# Patient Record
Sex: FEMALE | Race: WHITE | Employment: FULL TIME | URBAN - METROPOLITAN AREA
[De-identification: names, ages, dates, MRNs, and addresses within clinical notes are randomized per-mention and may not be internally consistent; named-entity substitution may affect disease eponyms.]

---

## 2017-03-09 ENCOUNTER — OFFICE VISIT (OUTPATIENT)
Dept: FAMILY MEDICINE CLINIC | Age: 27
End: 2017-03-09

## 2017-03-09 VITALS
DIASTOLIC BLOOD PRESSURE: 76 MMHG | WEIGHT: 138.4 LBS | TEMPERATURE: 97.8 F | OXYGEN SATURATION: 98 % | HEART RATE: 81 BPM | SYSTOLIC BLOOD PRESSURE: 126 MMHG | BODY MASS INDEX: 23.21 KG/M2

## 2017-03-09 DIAGNOSIS — Z23 NEED FOR IMMUNIZATION AGAINST TYPHOID: Primary | ICD-10-CM

## 2017-03-09 DIAGNOSIS — B07.0 PLANTAR WART, LEFT FOOT: ICD-10-CM

## 2017-03-09 PROCEDURE — 17110 DESTRUCTION B9 LES UP TO 14: CPT | Performed by: FAMILY MEDICINE

## 2017-03-09 PROCEDURE — 99213 OFFICE O/P EST LOW 20 MIN: CPT | Performed by: FAMILY MEDICINE

## 2018-01-29 ENCOUNTER — PATIENT MESSAGE (OUTPATIENT)
Dept: FAMILY MEDICINE CLINIC | Age: 28
End: 2018-01-29

## 2018-01-29 NOTE — TELEPHONE ENCOUNTER
From: [address]  To: Lay Adrian MD  Sent: 1/29/2018 2:55 PM EST  Subject: Non-Urgent Medical Question    Hi Rosa,     I am not looking to remove it this week (I have an appointment next week with Dr. Clover Ponce to remove). I am simply looking to help the pain (numb it) while I ski for the next 5 days. Is there something you recommend for that?  ----- Message -----  From: Abel Monteiro  Sent: 1/29/2018 2:20 PM EST  To: Shruthi [address]  Subject: RE: Non-Urgent Medical Question  You can try over the counter:   Compound wart freeze off or fast acting liquid remover  Dr Kiera Ayala.      ----- Message -----   From: Shruthi St   Sent: 1/29/2018 9:31 AM EST   To: Lay Adrian MD  Subject: Non-Urgent Medical Question    Hel,     Last time I was in, I had Dr. Clover Ponce freeze a plantars wart on the bottom of my left foot. I need to schedule an appointment to come in again to freeze, however I am currently on a ski vacation and my foot is really bothering me. I really want to finish out my week skiing and am wondering if there is any sort of temporary topical numbing cream I can use or get a prescription for (like a topical lidocaine)?      Thanks,  Evens Calderon

## 2018-01-29 NOTE — TELEPHONE ENCOUNTER
From: [address]  To: Castillo Sweeney MD  Sent: 1/29/2018 9:31 AM EST  Subject: Non-Urgent Medical Question    Hello,     Last time I was in, I had Dr. Janessa Rosales freeze a plantars wart on the bottom of my left foot. I need to schedule an appointment to come in again to freeze, however I am currently on a ski vacation and my foot is really bothering me. I really want to finish out my week skiing and am wondering if there is any sort of temporary topical numbing cream I can use or get a prescription for (like a topical lidocaine)?      Thanks,  Hoang

## 2018-02-19 ENCOUNTER — OFFICE VISIT (OUTPATIENT)
Dept: FAMILY MEDICINE CLINIC | Age: 28
End: 2018-02-19

## 2018-02-19 VITALS
SYSTOLIC BLOOD PRESSURE: 117 MMHG | WEIGHT: 145 LBS | RESPIRATION RATE: 18 BRPM | OXYGEN SATURATION: 98 % | HEART RATE: 99 BPM | DIASTOLIC BLOOD PRESSURE: 82 MMHG | HEIGHT: 66 IN | BODY MASS INDEX: 23.3 KG/M2 | TEMPERATURE: 98.1 F

## 2018-02-19 DIAGNOSIS — B07.0 PLANTAR WART, LEFT FOOT: Primary | ICD-10-CM

## 2018-02-19 PROCEDURE — 17110 DESTRUCTION B9 LES UP TO 14: CPT | Performed by: FAMILY MEDICINE

## 2018-05-15 PROBLEM — O00.90 ECTOPIC PREGNANCY: Status: ACTIVE | Noted: 2018-05-01

## 2019-09-27 ENCOUNTER — OFFICE VISIT (OUTPATIENT)
Dept: FAMILY MEDICINE CLINIC | Age: 29
End: 2019-09-27
Payer: COMMERCIAL

## 2019-09-27 VITALS
OXYGEN SATURATION: 97 % | DIASTOLIC BLOOD PRESSURE: 86 MMHG | BODY MASS INDEX: 25.56 KG/M2 | TEMPERATURE: 98.2 F | SYSTOLIC BLOOD PRESSURE: 131 MMHG | HEART RATE: 77 BPM | WEIGHT: 156 LBS | RESPIRATION RATE: 16 BRPM

## 2019-09-27 DIAGNOSIS — Z23 NEED FOR CHICKENPOX VACCINATION: ICD-10-CM

## 2019-09-27 DIAGNOSIS — O00.90 ECTOPIC PREGNANCY, UNSPECIFIED LOCATION, UNSPECIFIED WHETHER INTRAUTERINE PREGNANCY PRESENT: ICD-10-CM

## 2019-09-27 DIAGNOSIS — Z23 FLU VACCINE NEED: ICD-10-CM

## 2019-09-27 DIAGNOSIS — E28.2 PCOS (POLYCYSTIC OVARIAN SYNDROME): Primary | ICD-10-CM

## 2019-09-27 PROCEDURE — 90686 IIV4 VACC NO PRSV 0.5 ML IM: CPT | Performed by: FAMILY MEDICINE

## 2019-09-27 PROCEDURE — 90471 IMMUNIZATION ADMIN: CPT | Performed by: FAMILY MEDICINE

## 2019-09-27 PROCEDURE — 90716 VAR VACCINE LIVE SUBQ: CPT | Performed by: FAMILY MEDICINE

## 2019-09-27 PROCEDURE — 99213 OFFICE O/P EST LOW 20 MIN: CPT | Performed by: FAMILY MEDICINE

## 2019-09-27 PROCEDURE — 90472 IMMUNIZATION ADMIN EACH ADD: CPT | Performed by: FAMILY MEDICINE

## 2019-09-27 NOTE — PROGRESS NOTES
Patient is here for follow up . She was seeing fertility specialist , Dr. Jose Glasgow. She has had 2 ectopic pregnancies - 5/18 - right;  8/19 - ? Side. She took MTX X 1 shot. No prior STDs. She was diagnosed with PCOS. She was thinking about Clomid, IVF. She goes to [facility].  Gynecologist = Dr. Tana Aguilar. Dr. Mala Livingston did first ectopic. Review of Systems    ROS: All other systems were reviewed and are negative . Patient's allergies and medications were reviewed. Patient's past medical, surgical, social , and family history were reviewed. OBJECTIVE:  /86   Pulse 77   Temp 98.2 °F (36.8 °C) (Oral)   Resp 16   Wt 156 lb (70.8 kg)   LMP 09/23/2019 (Approximate)   SpO2 97%   Breastfeeding? No   BMI 25.56 kg/m²     Physical Exam    General: NAD, cooperative, alert and oriented X 3. Mood / affect is good. good insight. well hydrated. Neck : no lymphadenopathy, supple, FROM  CV: Regular rate and rhythm , no murmurs/ rub/ gallop. No edema. Lungs : CTA bilaterally, breathing comfortably  Abdomen: positive bowel sounds, soft , non tender, non distended. No hepatosplenomegaly. No CVA tenderness. Skin: no rashes. Non tender. ASSESSMENT/  PLAN:  1. PCOS (polycystic ovarian syndrome)  - stable. 2. Ectopic pregnancy, unspecified location, unspecified whether intrauterine pregnancy present  - followed by Fertility specialist.      3. Need for chickenpox vaccination  - Varicella vaccine subcutaneous (VARIVAX)    4. Flu vaccine need  - INFLUENZA, QUADV, 0.5ML, 6 MO AND OLDER, IM, PF, PREFILL SYR OR SDV (FLUZONE QUADV, PF)    Follow up in 6 months/ prn.

## 2019-09-27 NOTE — PROGRESS NOTES
Vaccine Information Sheet, \"Influenza - Inactivated\"  given to Zahraa Schuler, or parent/legal guardian of  Zahraa Schuler and verbalized understanding. Patient responses:    Have you ever had a reaction to a flu vaccine? No  Do you have any current illness? No  Have you ever had Guillian [place] Syndrome? No  Do you have a serious allergy to any of the follow: Neomycin, Polymyxin, Thimerosal, eggs or egg products? No    Flu vaccine given per order. Please see immunization tab. Risks and benefits explained. Current VIS given.       Immunizations Administered     Name Date Dose Route    Influenza, Quadv, IM, PF (6 mo and older Fluzone, Flulaval, Fluarix, and 3 yrs and older Afluria) 9/27/2019 0.5 mL Intramuscular    Site: Deltoid- Left    Lot: [id number]    ND: 75567-600-65    Varicella (Varivax) 9/27/2019 0.5 mL Subcutaneous    Site: Deltoid- Left    Lot: [id number]    ND: 5687-2173-07

## 2019-09-29 PROBLEM — E28.2 PCOS (POLYCYSTIC OVARIAN SYNDROME): Status: ACTIVE | Noted: 2019-09-29

## 2019-10-21 ENCOUNTER — TELEPHONE (OUTPATIENT)
Dept: FAMILY MEDICINE CLINIC | Age: 29
End: 2019-10-21

## 2019-10-28 ENCOUNTER — NURSE ONLY (OUTPATIENT)
Dept: FAMILY MEDICINE CLINIC | Age: 29
End: 2019-10-28
Payer: COMMERCIAL

## 2019-10-28 VITALS — TEMPERATURE: 98.2 F

## 2019-10-28 DIAGNOSIS — Z23 NEED FOR VARICELLA VACCINE: Primary | ICD-10-CM

## 2019-10-28 PROCEDURE — 90471 IMMUNIZATION ADMIN: CPT | Performed by: FAMILY MEDICINE

## 2019-10-28 PROCEDURE — 90716 VAR VACCINE LIVE SUBQ: CPT | Performed by: FAMILY MEDICINE

## 2020-11-10 ENCOUNTER — VIRTUAL VISIT (OUTPATIENT)
Dept: FAMILY MEDICINE CLINIC | Age: 30
End: 2020-11-10
Payer: COMMERCIAL

## 2020-11-10 PROCEDURE — 99213 OFFICE O/P EST LOW 20 MIN: CPT | Performed by: FAMILY MEDICINE

## 2020-11-10 RX ORDER — SULFAMETHOXAZOLE AND TRIMETHOPRIM 800; 160 MG/1; MG/1
1 TABLET ORAL 2 TIMES DAILY
Qty: 20 TABLET | Refills: 0 | Status: SHIPPED | OUTPATIENT
Start: 2020-11-10 | End: 2020-11-20

## 2020-11-10 RX ORDER — NORETHINDRONE AND ETHINYL ESTRADIOL 1 MG-35MCG
1 KIT ORAL DAILY
COMMUNITY
End: 2022-02-24

## 2020-11-10 NOTE — PROGRESS NOTES
11/10/2020    TELEHEALTH EVALUATION -- Audio/Visual (During - public health emergency)    Due to COVID 19 outbreak, patient's office visit was converted to a virtual visit. Patient was contacted and agreed to proceed with a virtual visit via [employer]. me  The risks and benefits of converting to a virtual visit were discussed in light of the current infectious disease epidemic. Patient also understood that insurance coverage and co-pays are up to their individual insurance plans. HPI:    Clarence Dominguez (:  1990) has requested an audio/video evaluation for the following concern(s):    Right great toenail problem. Patient is in [place] now - got there on Friday and leaving Saturday. Right great toenail issue started 3 weeks ago. She has had problems in the past, but usually can usually work it out , but flaring the past 3 days. She started soaks 2 days ago. Sand likely aggravated it. No drainage now or prior. Has been running. She is on birth control pills to regulate cycle. She will likely retry IVF in a few months. Review of Systems    Prior to Visit Medications    Medication Sig Taking?  Authorizing Provider   metFORMIN (GLUCOPHAGE) 850 MG tablet Take 850 mg by mouth daily Yes Historical Provider, MD   norethindrone-ethinyl estradiol (DASETTA ) 1-35 MG-MCG per tablet Take 1 tablet by mouth daily Yes Historical Provider, MD   typhoid vaccine (VIVOTIF) delayed release capsule Take 1 capsule by mouth every other day  Patient not taking: Reported on 2019  Claudell Maid, MD   PROVENTIL  (90 BASE) MCG/ACT inhaler INHALE 2 PUFFS INTO THE LUNGS EVERY 6 HOURS AS NEEDED FOR WHEEZING  Patient not taking: Reported on 2019  Claudell Maid, MD   omeprazole (PRILOSEC) 40 MG capsule Take 1 capsule by mouth daily  Patient not taking: Reported on 2019  Claudell Maid, MD   spironolactone (ALDACTONE) 100 MG tablet Take 1 tablet by mouth daily  Patient not taking: Reported on 9/27/2019  Thais Nicolas MD   minocycline (MINOCIN;DYNACIN) 100 MG capsule Take 1 po qd. Patient not taking: Reported on 9/27/2019  Thais Nicolas MD   norgestimate-ethinyl estradiol (MONONESSA) 0.25-35 MG-MCG per tablet Take 1 tablet by mouth daily. Historical Provider, MD       No Known Allergies    Social History     Tobacco Use    Smoking status: Never Smoker    Smokeless tobacco: Never Used   Substance Use Topics    Alcohol use: Yes     Alcohol/week: 4.0 standard drinks     Types: 4 Standard drinks or equivalent per week     Comment: occasionally    Drug use: Not on file        Past Medical History:   Diagnosis Date    Acne     Dysmenorrhea     Ectopic pregnancy 05/2018, 8/19    right    Female infertility due to occlusion of fallopian tube 09/2018    right.  History of chickenpox 1993       Past Surgical History:   Procedure Laterality Date    WISDOM TOOTH EXTRACTION         Health Maintenance   Topic Date Due    HIV screen  04/10/2005    Cervical cancer screen  04/10/2011    Potassium monitoring  04/25/2017    Creatinine monitoring  04/25/2017    Flu vaccine (1) 09/01/2020    DTaP/Tdap/Td vaccine (8 - Td) 02/01/2022    Hepatitis A vaccine  Completed    Hepatitis B vaccine  Completed    Varicella vaccine  Completed    Meningococcal (ACWY) vaccine  Completed    Hib vaccine  Aged Out    Pneumococcal 0-64 years Vaccine  Aged Out       Family History   Problem Relation Age of Onset    Breast Cancer Maternal Grandmother        PHYSICAL EXAMINATION:    Vital Signs: (As obtained by patient/caregiver or practitioner observation)     Patient-Reported Vitals 11/10/2020   Patient-Reported Weight 148 lb   Patient-Reported Height 5 ft 6 in      Heart rate= 80  Respiratory rate= 14 Temperature= 98      Constitutional:  Appears well-developed and well-nourished. No apparent distress                              Mental status:  Alert and awake. Oriented to person/place/time.  Able to follow commands Eyes: EOM intact. Sclera-normal. No erythema of conjunctiva. No eye discharge. HENT: Normocephalic, atraumatic. Mouth/Throat: normal. Mucous membranes are moist.      External Ears: Normal       Neck: No visualized mass      Pulmonary/Chest:  Respiratory effort normal.  No visualized signs of difficulty breathing or respiratory distress         Musculoskeletal:   Normal gait with no signs of ataxia. Normal range of motion of neck. Neurological:         No Facial Asymmetry (Cranial nerve 7 motor function) (limited exam to video visit) . No gaze palsy              Skin:                     No significant exanthematous lesions or discoloration noted on facial skin    Skin (right foot): erythema to lateral great toe edge extending to base of toe with tenderness. No drainage. Psychiatric:          Normal Affect. No Hallucinations           Other pertinent observable physical exam findings:       ASSESSMENT/PLAN:  1. Paronychia of great toe, right  - Warm soaks 3-4 x/ day . - avoid running to avoid irritation . Wear looser fitting shoes. - sulfamethoxazole-trimethoprim (BACTRIM DS) 800-160 MG per tablet; Take 1 tablet by mouth 2 times daily for 10 days  Dispense: 20 tablet; Refill: 0  - if no improvement over next 7-10 days follow up or if recurrence recommended podiatry referral   - Recommended to avoid pregnancy and use sunscreen. F/u if no improvement 7-10d/ prn increased symptoms. The time that was spent with the family/patient addressing care on this video call was 15 minutes. An  electronic signature was used to authenticate this note.     --Mattie Ernst MD on 11/10/2020 at 10:21 AM    Pursuant to the emergency declaration under the [facility], [facility] waiver authority and the [employer] Act, this Virtual  Visit was conducted, with patient's consent, to reduce the patient's risk of exposure to COVID-19 and provide continuity of care for an established patient. Services were provided through a video synchronous discussion virtually to substitute for in-person clinic visit.

## 2021-06-29 ENCOUNTER — TELEPHONE (OUTPATIENT)
Dept: FAMILY MEDICINE CLINIC | Age: 31
End: 2021-06-29

## 2021-06-29 NOTE — TELEPHONE ENCOUNTER
----- Message from Juju Coe sent at 6/29/2021  2:07 PM EDT -----  Subject: Message to Provider    QUESTIONS  Information for Provider? Patient would like to know should she have her   tdap done. She is currently pregnant due 10/9/21 and didn't know if she   should wait till it was ten years to have another shot.   ---------------------------------------------------------------------------  --------------  [address]  What is the best way for the office to contact you? OK to respond with   electronic message via [employer] portal (only for patients who have   registered [employer] account)  Preferred Call Back Phone Number? [phone]  ---------------------------------------------------------------------------  --------------  SCRIPT ANSWERS  Relationship to Patient?  Self

## 2022-02-24 ENCOUNTER — TELEPHONE (OUTPATIENT)
Dept: FAMILY MEDICINE CLINIC | Age: 32
End: 2022-02-24

## 2022-02-24 ENCOUNTER — PATIENT MESSAGE (OUTPATIENT)
Dept: FAMILY MEDICINE CLINIC | Age: 32
End: 2022-02-24

## 2022-02-24 ENCOUNTER — TELEMEDICINE (OUTPATIENT)
Dept: FAMILY MEDICINE CLINIC | Age: 32
End: 2022-02-24
Payer: COMMERCIAL

## 2022-02-24 DIAGNOSIS — J01.00 ACUTE MAXILLARY SINUSITIS, RECURRENCE NOT SPECIFIED: Primary | ICD-10-CM

## 2022-02-24 PROCEDURE — 99213 OFFICE O/P EST LOW 20 MIN: CPT | Performed by: FAMILY MEDICINE

## 2022-02-24 RX ORDER — AMOXICILLIN AND CLAVULANATE POTASSIUM 875; 125 MG/1; MG/1
1 TABLET, FILM COATED ORAL 2 TIMES DAILY
Qty: 20 TABLET | Refills: 0 | Status: SHIPPED | OUTPATIENT
Start: 2022-02-24 | End: 2022-03-06

## 2022-02-24 SDOH — ECONOMIC STABILITY: FOOD INSECURITY: WITHIN THE PAST 12 MONTHS, THE FOOD YOU BOUGHT JUST DIDN'T LAST AND YOU DIDN'T HAVE MONEY TO GET MORE.: NEVER TRUE

## 2022-02-24 SDOH — ECONOMIC STABILITY: FOOD INSECURITY: WITHIN THE PAST 12 MONTHS, YOU WORRIED THAT YOUR FOOD WOULD RUN OUT BEFORE YOU GOT MONEY TO BUY MORE.: NEVER TRUE

## 2022-02-24 ASSESSMENT — PATIENT HEALTH QUESTIONNAIRE - PHQ9
SUM OF ALL RESPONSES TO PHQ QUESTIONS 1-9: 0
SUM OF ALL RESPONSES TO PHQ QUESTIONS 1-9: 0
2. FEELING DOWN, DEPRESSED OR HOPELESS: 0
1. LITTLE INTEREST OR PLEASURE IN DOING THINGS: 0
SUM OF ALL RESPONSES TO PHQ QUESTIONS 1-9: 0
SUM OF ALL RESPONSES TO PHQ9 QUESTIONS 1 & 2: 0
SUM OF ALL RESPONSES TO PHQ QUESTIONS 1-9: 0

## 2022-02-24 ASSESSMENT — SOCIAL DETERMINANTS OF HEALTH (SDOH): HOW HARD IS IT FOR YOU TO PAY FOR THE VERY BASICS LIKE FOOD, HOUSING, MEDICAL CARE, AND HEATING?: NOT HARD AT ALL

## 2022-02-24 NOTE — TELEPHONE ENCOUNTER
From: Jayce Oconnor  To: Dr. Allegra Estevez: 2/24/2022 1:50 AM EST  Subject: Flu/covid symptoms + nursing mom  question on care    Hi Dr. Hossein Feliciano -    Are one of the nurses or members on your team able to give me a call regarding a current illness? I tried to make an appointment and was refused an in person because of my symptoms and refused a tele-call because [place] to [place]. Im going on day 4/5 of a sinus bug. Symptoms are congestion, runny nose (clear mucus), red eyes, dry cough, body aches, chills, and now spiking a fever over 100.4. I had covid on 1/1/22 and am testing negative on home tests. My son and I are part of a clinical trial at [facility] that swab for covid and flu weekly and those results are pending. I am currently taking Advil and on my last day of affrin (day 3). I am still fully nursing my son, so want to know what else I can take and care options. Finally, my  and son also recently had a sinus bug. My sons is mild, my husbands turned bacterial and he was prescribed a round of antibiotics. Mine seems more viral, and I have a fever and they never did. Thanks!

## 2022-02-24 NOTE — TELEPHONE ENCOUNTER
Hi Dr. Aida Ramon -    Are one of the nurses or members on your team able to give me a call regarding a current illness? I tried to make an appointment and was refused an in person because of my symptoms and refused a tele-call because I recently moved [place] the river to [place]. Im going on day 4/5 of a sinus bug. Symptoms are congestion, runny nose (clear mucus), red eyes, dry cough, body aches, chills, and now spiking a fever over 100.4. I had covid on 1/1/22 and am testing negative on home tests. My son and I are part of a clinical trial at [facility] that swab for covid and flu weekly and those results are pending. I am currently taking Advil and on my last day of affrin (day 3). I am still fully nursing my son, so want to know what else I can take and care options. Finally, my  and son also recently had a sinus bug. My sons is mild, my husbands turned bacterial and he was prescribed a round of antibiotics. Mine seems more viral, and I have a fever and they never did. Thanks! With her sx do you want to see her in person since she tested negative?

## 2022-02-24 NOTE — TELEPHONE ENCOUNTER
Please call the patient to see how long she has had a fever and how high now. Any shortness of breath ? Improving / worsening ? Push fluids - water. She could take Mucinex. If getting to be more sinus infection symptoms- yellow /green nasal discharge, headaches, teeth /ear pain and post nasal drip , have her schedule a VV appointment. Flu is also a possibility, but usually fever is the first 3 days and not after 4/5 days.

## 2022-02-24 NOTE — TELEPHONE ENCOUNTER
Patient is scheduled for a VV with Dr. Niko Jin on 02/24/22.----- Message from Kiersten Reyes sent at 2/23/2022  2:16 PM EST -----  Subject: Appointment Request    Reason for Call: Urgent Cough Cold    QUESTIONS  Type of Appointment? Established Patient  Reason for appointment request? Available appointments did not meet   patient need  Additional Information for Provider? Pt is having sinus symptoms fatigue   runny nose , testing negative for covid and has a fever and is breast   feeding and wants to know if Dr. Niko Jin can call her in something for her   symptoms   ---------------------------------------------------------------------------  --------------  CALL BACK INFO  What is the best way for the office to contact you? OK to leave message on   voicemail  Preferred Call Back Phone Number? [phone]  ---------------------------------------------------------------------------  --------------  SCRIPT ANSWERS  Relationship to Patient? Self  Are you currently unable to finish sentences due to any difficulty   breathing? No  Are you unable to swallow liquids? No  Are you having fevers (100.4 or greater), chills, or sweats? Yes   Have you been diagnosed with, awaiting test results for, or told that you   are suspected of having COVID-19 (Coronavirus)? (If patient has tested   negative or was tested as a requirement for work, school, or travel and   not based on symptoms, answer no)? No  Within the past 10 days have you developed any of the following symptoms   (answer no if symptoms have been present longer than 10 days or began   more than 10 days ago)? Fever or Chills, Cough, Shortness of breath or   difficulty breathing, Loss of taste or smell, Sore throat, Nasal   congestion, Sneezing or runny nose, Fatigue or generalized body aches   (answer no if pain is specific to a body part e.g. back pain), Diarrhea,   Headache?  Yes

## 2022-02-24 NOTE — PROGRESS NOTES
2022    TELEHEALTH EVALUATION -- Audio/Visual (During ZXSOV-92 public health emergency)    Due to [place] 19 outbreak, patient's office visit was converted to a virtual visit. Patient was contacted and agreed to proceed with a virtual visit via [employer]. me  The risks and benefits of converting to a virtual visit were discussed in light of the current infectious disease epidemic. Patient also understood that insurance coverage and co-pays are up to their individual insurance plans. HPI:    Chucky Skelton (:  1990) has requested an audio/video evaluation for the following concern(s):    Started with headache, nasal congestion . 2 month old and  have been sick. Breatfeeding. Fever started last night . She had COVID about 22. She is tested weekly for COVID as part of [facility] study. She is also tested for flu with COVID. Ear pressure/ popping. Not sharp pain. post nasal drip . Used Afrin NS X 3 days. Cough is dry . sore throat . Some cheek pain . No nausea, vomiting. Loose stools on Monday only . No shortness of breath or wheezing. Sleeping a bit restlessly. Review of Systems    Prior to Visit Medications    Medication Sig Taking? Authorizing Provider   Prenatal Vit-Fe Fumarate-FA (PRENATAL VITAMINS PO) Take by mouth Yes Historical Provider, MD   Ascorbic Acid (VITAMIN C PO) Take by mouth Yes Historical Provider, MD       No Known Allergies    Social History     Tobacco Use    Smoking status: Never Smoker    Smokeless tobacco: Never Used   Substance Use Topics    Alcohol use: Yes     Alcohol/week: 4.0 standard drinks     Types: 4 Standard drinks or equivalent per week     Comment: occasionally    Drug use: Not on file        Past Medical History:   Diagnosis Date    Acne     Dysmenorrhea     Ectopic pregnancy 2018,     right    Female infertility due to occlusion of fallopian tube 2018    right.     History of chickenpox 46       Past Surgical History: Procedure Laterality Date    WISDOM TOOTH EXTRACTION         Health Maintenance   Topic Date Due    Hepatitis C screen  Never done    Depression Screen  Never done    HIV screen  Never done    Cervical cancer screen  Never done    DTaP/Tdap/Td vaccine (9 - Td or Tdap) 08/04/2031    Hepatitis A vaccine  Completed    Hepatitis B vaccine  Completed    Varicella vaccine  Completed    Meningococcal (ACWY) vaccine  Completed    Flu vaccine  Completed    COVID-19 Vaccine  Completed    Hib vaccine  Aged Out    Pneumococcal 0-64 years Vaccine  Aged Out       Family History   Problem Relation Age of Onset    Breast Cancer Maternal Grandmother        PHYSICAL EXAMINATION:    Vital Signs: (As obtained by patient/caregiver or practitioner observation)     Patient-Reported Vitals 2/24/2022   Patient-Reported Weight 165   Patient-Reported Height -   Patient-Reported Temperature 98.9        Heart rate= 80  Respiratory rate= 14      Constitutional:  Appears well-developed and well-nourished. No apparent distress                              Mental status:  Alert and awake. Oriented to person/place/time. Able to follow commands       Eyes: EOM intact. Sclera-normal. No erythema of conjunctiva. No eye discharge. HENT: Normocephalic, atraumatic. Mouth/Throat: normal. Mucous membranes are moist.      External Ears: Normal       Neck: No visualized mass      Pulmonary/Chest:  Respiratory effort normal.  No visualized signs of difficulty breathing or respiratory distress         Musculoskeletal:   Normal gait with no signs of ataxia. Normal range of motion of neck. Neurological:         No Facial Asymmetry (Cranial nerve 7 motor function) (limited exam to video visit) . No gaze palsy              Skin:                     No significant exanthematous lesions or discoloration noted on facial skin                                        Psychiatric:          Normal Affect.  No Hallucinations           Other pertinent observable physical exam findings:       ASSESSMENT/PLAN:  1. Acute maxillary sinusitis, recurrence not specified  - Push fluids - water. Netti pot prn - use distilled water or boil tap water & cool. - amoxicillin-clavulanate (AUGMENTIN) 875-125 MG per tablet; Take 1 tablet by mouth 2 times daily for 10 days  Dispense: 20 tablet; Refill: 0    F/u if no improvement 7-10d/ prn increased symptoms. An  electronic signature was used to authenticate this note. --Edwina Evans MD on 2/24/2022 at 10:55 AM    Pursuant to the emergency declaration under the [facility], [facility] waiver authority and the [employer] Act, this Virtual  Visit was conducted, with patient's consent, to reduce the patient's risk of exposure to COVID-19 and provide continuity of care for an established patient. Services were provided through a video synchronous discussion virtually to substitute for in-person clinic visit.

## 2022-02-24 NOTE — TELEPHONE ENCOUNTER
Patient scheduled with [facility] for VV 02/24/22.----- Message from Declan Tillman sent at 2/23/2022  8:12 AM EST -----  Subject: Appointment Request    Reason for Call: Semi-Routine Cough, Cold Symptoms    QUESTIONS  Type of Appointment? Established Patient  Reason for appointment request? No appointments available during search  Additional Information for Provider? Having sinus symptoms and would like   to do a virtual visit today She is a nursing mother and needs advise on   what she can and can not take   ---------------------------------------------------------------------------  --------------  [address]  What is the best way for the office to contact you? OK to leave message on   voicemail  Preferred Call Back Phone Number? [phone]  ---------------------------------------------------------------------------  --------------  SCRIPT ANSWERS  Relationship to Patient? Self  Are you currently unable to finish sentences due to any difficulty   breathing? No  Are you unable to swallow liquids? No  Are you having fevers (100.4 or greater), chills, or sweats? No  Do you have COPD, asthma or a chronic lung condition? No  Have your symptoms been present for more than 5 days? No  Have you recently (14 days) been seen by a provider for this issue? No  Have you been diagnosed with, awaiting test results for, or told that you   are suspected of having COVID-19 (Coronavirus)? (If patient has tested   negative or was tested as a requirement for work, school, or travel and   not based on symptoms, answer no)? No  Within the past 10 days have you developed any of the following symptoms   (answer no if symptoms have been present longer than 10 days or began   more than 10 days ago)?  Fever or Chills, Cough, Shortness of breath or   difficulty breathing, Loss of taste or smell, Sore throat, Nasal   congestion, Sneezing or runny nose, Fatigue or generalized body aches   (answer no if pain is specific to a body part e.g. back pain), Diarrhea,   Headache?  Yes

## 2022-11-02 ENCOUNTER — TELEMEDICINE (OUTPATIENT)
Dept: FAMILY MEDICINE CLINIC | Age: 32
End: 2022-11-02
Payer: COMMERCIAL

## 2022-11-02 DIAGNOSIS — J01.00 ACUTE NON-RECURRENT MAXILLARY SINUSITIS: Primary | ICD-10-CM

## 2022-11-02 PROCEDURE — 99213 OFFICE O/P EST LOW 20 MIN: CPT | Performed by: FAMILY MEDICINE

## 2022-11-02 RX ORDER — AMOXICILLIN AND CLAVULANATE POTASSIUM 875; 125 MG/1; MG/1
1 TABLET, FILM COATED ORAL 2 TIMES DAILY
Qty: 20 TABLET | Refills: 0 | Status: SHIPPED | OUTPATIENT
Start: 2022-11-02 | End: 2022-11-12

## 2022-11-02 NOTE — PROGRESS NOTES
2022    TELEHEALTH EVALUATION -- Audio/Visual (During RRDIY-84 public health emergency)    HPI:    Ashley Landers (:  1990) has requested an audio/video evaluation for the following concern(s):    URI    Son is in . He was sick last week. Sinus congestion, nasal congestion. Thick yellow to green nasal.  Dry cough. No chest pain or dyspnea. Denies fever, ST.  Ears are full. No nausea, vomiting, diarrhea   Pregnant 25 weeks. Review of Systems    Prior to Visit Medications    Medication Sig Taking? Authorizing Provider   Prenatal Vit-Fe Fumarate-FA (PRENATAL VITAMINS PO) Take by mouth Yes Historical Provider, MD   Ascorbic Acid (VITAMIN C PO) Take by mouth Yes Historical Provider, MD       Social History     Tobacco Use    Smoking status: Never    Smokeless tobacco: Never   Substance Use Topics    Alcohol use: Yes     Alcohol/week: 4.0 standard drinks     Types: 4 Standard drinks or equivalent per week     Comment: occasionally        No Known Allergies,   Past Medical History:   Diagnosis Date    Acne     Dysmenorrhea     Ectopic pregnancy 2018,     right    Female infertility due to occlusion of fallopian tube 2018    right.     History of chickenpox 1993       PHYSICAL EXAMINATION:  [ INSTRUCTIONS:  \"[x]\" Indicates a positive item  \"[]\" Indicates a negative item  -- DELETE ALL ITEMS NOT EXAMINED]  Vital Signs: (As obtained by patient/caregiver or practitioner observation)    Blood pressure-  Heart rate-    Respiratory rate-    Temperature-  Pulse oximetry-   Wt 180 lb  Wt Readings from Last 3 Encounters:   19 156 lb (70.8 kg)   18 145 lb (65.8 kg)   17 138 lb 6.4 oz (62.8 kg)     Temp Readings from Last 3 Encounters:   10/28/19 98.2 °F (36.8 °C) (Oral)   19 98.2 °F (36.8 °C) (Oral)   18 98.1 °F (36.7 °C)     BP Readings from Last 3 Encounters:   19 131/86   18 117/82   17 126/76     Pulse Readings from Last 3 Encounters: 09/27/19 77   02/19/18 99   03/09/17 81      Constitutional: [x] Appears well-developed and well-nourished [x] No apparent distress      [] Abnormal-   Mental status  [x] Alert and awake  [x] Oriented to person/place/time [x]Able to follow commands      Eyes:  EOM    [x]  Normal  [] Abnormal-  Sclera  []  Normal  [] Abnormal -         Discharge []  None visible  [] Abnormal -    HENT:   [x] Normocephalic, atraumatic. [] Abnormal       Neck: [x] No visualized mass     Pulmonary/Chest: [x] Respiratory effort normal.  [x] No visualized signs of difficulty breathing or respiratory distress        [] Abnormal-         Neurological:        [x] No Facial Asymmetry (Cranial nerve 7 motor function) (limited exam to video visit)             Skin:        [x] No significant exanthematous lesions or discoloration noted on facial skin         [] Abnormal-            Psychiatric:       [x] Normal Affect [x] No Hallucinations        [] Abnormal-     Other pertinent observable physical exam findings-     ASSESSMENT/PLAN:  1. Acute non-recurrent maxillary sinusitis  Side effects of current medications reviewed and questions answered. Call or return to clinic prn if these symptoms worsen or fail to improve as anticipated. - amoxicillin-clavulanate (AUGMENTIN) 875-125 MG per tablet; Take 1 tablet by mouth 2 times daily for 10 days  Dispense: 20 tablet; Refill: 0    No follow-ups on file. Lynsey Fan, was evaluated through a synchronous (real-time) audio-video encounter. The patient (or guardian if applicable) is aware that this is a billable service, which includes applicable co-pays. This Virtual Visit was conducted with patient's (and/or legal guardian's) consent. The visit was conducted pursuant to the emergency declaration under the [facility], [address] [place] [address] authority and the [employer] and [employer] Act.   Patient identification was verified, and a caregiver was present when appropriate. The patient was located at Home: [address] [address]. Provider was located at [facility] (Appt Dept): [address]  [address] [facility],  [address] [place] [address]. Total time spent on this encounter: Not billed by time    --Deirdre Monroe MD on 11/2/2022 at 1:20 PM    An electronic signature was used to authenticate this note.

## 2023-03-27 ENCOUNTER — TELEMEDICINE (OUTPATIENT)
Dept: FAMILY MEDICINE CLINIC | Age: 33
End: 2023-03-27
Payer: COMMERCIAL

## 2023-03-27 DIAGNOSIS — L03.011 PARONYCHIA OF RIGHT THUMB: Primary | ICD-10-CM

## 2023-03-27 DIAGNOSIS — H10.9 BACTERIAL CONJUNCTIVITIS: ICD-10-CM

## 2023-03-27 PROCEDURE — 99213 OFFICE O/P EST LOW 20 MIN: CPT | Performed by: FAMILY MEDICINE

## 2023-03-27 RX ORDER — AMOXICILLIN AND CLAVULANATE POTASSIUM 875; 125 MG/1; MG/1
1 TABLET, FILM COATED ORAL 2 TIMES DAILY
Qty: 14 TABLET | Refills: 0 | Status: SHIPPED | OUTPATIENT
Start: 2023-03-27 | End: 2023-04-03

## 2023-03-27 RX ORDER — NEOMYCIN SULFATE, POLYMYXIN B SULFATE AND DEXAMETHASONE 3.5; 10000; 1 MG/ML; [USP'U]/ML; MG/ML
SUSPENSION/ DROPS OPHTHALMIC
COMMUNITY
Start: 2023-03-24

## 2023-03-27 SDOH — ECONOMIC STABILITY: FOOD INSECURITY: WITHIN THE PAST 12 MONTHS, THE FOOD YOU BOUGHT JUST DIDN'T LAST AND YOU DIDN'T HAVE MONEY TO GET MORE.: NEVER TRUE

## 2023-03-27 SDOH — ECONOMIC STABILITY: FOOD INSECURITY: WITHIN THE PAST 12 MONTHS, YOU WORRIED THAT YOUR FOOD WOULD RUN OUT BEFORE YOU GOT MONEY TO BUY MORE.: NEVER TRUE

## 2023-03-27 SDOH — ECONOMIC STABILITY: HOUSING INSECURITY
IN THE LAST 12 MONTHS, WAS THERE A TIME WHEN YOU DID NOT HAVE A STEADY PLACE TO SLEEP OR SLEPT IN A SHELTER (INCLUDING NOW)?: NO

## 2023-03-27 SDOH — ECONOMIC STABILITY: INCOME INSECURITY: HOW HARD IS IT FOR YOU TO PAY FOR THE VERY BASICS LIKE FOOD, HOUSING, MEDICAL CARE, AND HEATING?: NOT HARD AT ALL

## 2023-03-27 ASSESSMENT — PATIENT HEALTH QUESTIONNAIRE - PHQ9
SUM OF ALL RESPONSES TO PHQ QUESTIONS 1-9: 0
2. FEELING DOWN, DEPRESSED OR HOPELESS: 0
SUM OF ALL RESPONSES TO PHQ QUESTIONS 1-9: 0
SUM OF ALL RESPONSES TO PHQ9 QUESTIONS 1 & 2: 0
SUM OF ALL RESPONSES TO PHQ QUESTIONS 1-9: 0
SUM OF ALL RESPONSES TO PHQ QUESTIONS 1-9: 0
1. LITTLE INTEREST OR PLEASURE IN DOING THINGS: 0

## 2023-03-27 NOTE — PROGRESS NOTES
suspension SHAKE LIQUID AND INSTILL 1 DROP IN BOTH EYES FOUR TIMES DAILY FOR 7 DAYS      amoxicillin-clavulanate (AUGMENTIN) 875-125 MG per tablet Take 1 tablet by mouth 2 times daily for 7 days 14 tablet 0    Prenatal Vit-Fe Fumarate-FA (PRENATAL VITAMINS PO) Take by mouth      Ascorbic Acid (VITAMIN C PO) Take by mouth       No current facility-administered medications for this visit. No Known Allergies     PHYSICAL EXAMINATION:    All boxes checked are findings on exam, empty boxes are negative or not evaluated during the examination  Limitation in exam due to use of video conferencing software, virtual visits    Vital Signs: (As obtained by patient/caregiver or practitioner observation)  There were no vitals taken for this visit. Constitutional: [x] Appears well-developed and well-nourished [x] No apparent distress      [] Abnormal-   Mental status  [x] Alert and awake  [x] Oriented to person/place/time []Able to follow commands      Eyes:  EOM    []  Normal  [] Abnormal-  Sclera  []  Normal  [] Abnormal -         Discharge []  None visible  [] Abnormal -    HENT:   [x] Normocephalic, atraumatic. [] Abnormal   [] Mouth/Throat: Mucous membranes are moist.     External Ears [] Normal  [] Abnormal-     Neck: [x] No visualized mass     Pulmonary/Chest: [x] Respiratory effort normal.  [x] No visualized signs of difficulty breathing or respiratory distress        [] Abnormal-      Musculoskeletal:   [] Normal gait with no signs of ataxia         [] Normal range of motion of neck        [] Abnormal-       Neurological:        [] No Facial Asymmetry (Cranial nerve 7 motor function) (limited exam to video visit)          [] No gaze palsy        [] Abnormal-         Skin:        [] No significant exanthematous lesions or discoloration noted on facial skin         [x] Abnormal- R thumb with swelling, erythema, redness to lateral nail bed/edge.   No visible discharge, no streaking           Psychiatric:       [x]